# Patient Record
Sex: MALE | Race: WHITE | NOT HISPANIC OR LATINO | Employment: OTHER | URBAN - METROPOLITAN AREA
[De-identification: names, ages, dates, MRNs, and addresses within clinical notes are randomized per-mention and may not be internally consistent; named-entity substitution may affect disease eponyms.]

---

## 2017-01-06 ENCOUNTER — OFFICE VISIT (OUTPATIENT)
Dept: OPHTHALMOLOGY | Facility: CLINIC | Age: 82
End: 2017-01-06
Payer: MEDICARE

## 2017-01-06 VITALS — DIASTOLIC BLOOD PRESSURE: 74 MMHG | SYSTOLIC BLOOD PRESSURE: 145 MMHG | HEART RATE: 63 BPM

## 2017-01-06 DIAGNOSIS — H35.3211 EXUDATIVE AGE-RELATED MACULAR DEGENERATION OF RIGHT EYE WITH ACTIVE CHOROIDAL NEOVASCULARIZATION: Primary | ICD-10-CM

## 2017-01-06 PROCEDURE — 92014 COMPRE OPH EXAM EST PT 1/>: CPT | Mod: 25,S$PBB,, | Performed by: OPHTHALMOLOGY

## 2017-01-06 PROCEDURE — 99999 PR PBB SHADOW E&M-EST. PATIENT-LVL III: CPT | Mod: PBBFAC,,, | Performed by: OPHTHALMOLOGY

## 2017-01-06 PROCEDURE — 99213 OFFICE O/P EST LOW 20 MIN: CPT | Mod: PBBFAC | Performed by: OPHTHALMOLOGY

## 2017-01-06 PROCEDURE — 92134 CPTRZ OPH DX IMG PST SGM RTA: CPT | Mod: PBBFAC | Performed by: OPHTHALMOLOGY

## 2017-01-06 PROCEDURE — 67028 INJECTION EYE DRUG: CPT | Mod: PBBFAC,RT | Performed by: OPHTHALMOLOGY

## 2017-01-06 PROCEDURE — 67028 INJECTION EYE DRUG: CPT | Mod: S$PBB,RT,, | Performed by: OPHTHALMOLOGY

## 2017-01-06 RX ADMIN — AFLIBERCEPT 2 MG: 40 INJECTION, SOLUTION INTRAVITREAL at 12:01

## 2017-01-06 NOTE — PROGRESS NOTES
HPI     DLS 10/20/16        89 Y/O M here today for his 1 mo Eylea OD #22 ck for   the treatment of ARMD. Pt states' my vision is about the same and no   complications after last injection. stj       POHX:   S/P Lucentis OD x 2 (11/02/2015)   S/P Eylea OD X 22 (10/20/16)   1. Retinal Vasculitis   2. Posterior Uveitis   3. H/X Retinal Hole/ Tear OS   S/P Cryo OS X 2 (5/1/86; 4/7/86)                  Last edited by Ngoc Adorno MA on 1/6/2017 10:52 AM.     [place] SDOCT:   OD: good quality, PED with SRF, slight increase over prior scan  OS: good quality, ERM, RPE atrophy, no fluid    Assessment /Plan     For exam results, see Encounter Report.    Exudative age-related macular degeneration of right eye with active choroidal neovascularization  -     Prior Authorization Order  -     aflibercept Soln 2 mg; 0.05 mLs (2 mg total) by Intravitreal route one time.  -     Posterior Segment OCT Retina-Both eyes    Other orders  -     Cancel: OCT- Retina    Recommend continue Eylea OD for active CNVM with SRF    Risks, benefits, and alternatives to treatment were discussed in detail with the patient.  The patient voiced understanding and wished to proceed with the procedure.  See separate consent form.    Eyelea Injection Procedure Note:  Diagnosis: Wet AMD Right Eye    Topical Proparacaine drop placed then topical 10% Betadine swabs to lids, lashes, and conjunctiva.  Viscous 2% lidocaine applied to conjunctiva.  Sterile gloves used, and sterile lid speculum placed.  10% Betadine swabbed at injection site again prior to injection.  Eylea 2 mg in 0.05cc Injected at 8:00 position 3.5-4mm posterior to the limbus.  Complications: None  Va at least CF at 5 feet post injection.  Retina, ONH, IOP normal after injection.    Return to clinic in 1 months for reevaluation, OCT, and possible injection depending upon findings.  Patient should return immediately PRN.  Retinal Detachment and Endophthalmitis precautions given.

## 2017-01-06 NOTE — PATIENT INSTRUCTIONS

## 2017-01-06 NOTE — MR AVS SNAPSHOT
[facility] - Ophthalmology  [address]  [place] [address]  Phone: [phone]  Fax: [phone]                  MECHE Lacey   2017 10:30 AM   Office Visit    Description:  Male : 3/13/1928   Provider:  Nick Munoz MD   Department:  [facility] - Ophthalmology           Reason for Visit     1 mo W/ARMD CK           Diagnoses this Visit        Comments    Exudative age-related macular degeneration of right eye with active choroidal neovascularization    -  Primary            To Do List           Future Appointments        Provider Department Dept Phone    2017 1:00 PM Katia Chauhan MD [facility] [phone]    2017 9:30 AM Nick Munoz MD [facility] [phone]      Goals (5 Years of Data)     None      [facility]     [facility] Nurse Care Line - 24/7 Assistance  Registered nurses in the [facility] provide clinical advisement, health education, appointment booking, and other advisory services.  Call for this free service at [phone].             Medications           Message regarding Medications     Verify the changes and/or additions to your medication regime listed below are the same as discussed with your clinician today.  If any of these changes or additions are incorrect, please notify your healthcare provider.        These medications were administered today        Dose Freq    aflibercept Soln 2 mg 2 mg Clinic/HOD 1 time    Si.05 mLs (2 mg total) by Intravitreal route one time.    Class: Normal    Route: Intravitreal           Verify that the below list of medications is an accurate representation of the medications you are currently taking.  If none reported, the list may be blank. If incorrect, please contact your healthcare provider. Carry this list with you in case of emergency.           Current Medications     aspirin (ECOTRIN) 81 MG EC tablet Take 81 mg by mouth once daily.       atorvastatin (LIPITOR) 40 MG tablet TAKE ONE TABLET BY MOUTH DAILY    b complex vitamins tablet Take 1 tablet by mouth once daily.      calcium carbonate 220 mg capsule Take 250 mg by mouth 2 (two) times daily with meals.      CLOPIDOGREL BISULFATE (PLAVIX ORAL) Take by mouth.      doxycycline (VIBRA-TABS) 100 MG tablet Take 100 mg by mouth as needed.    fish oil-omega-3 fatty acids 300-1,000 mg capsule Take 2 g by mouth once daily.      irbesartan (AVAPRO) 150 MG tablet Take 150 mg by mouth.    IRBESARTAN/HYDROCHLOROTHIAZIDE (AVALIDE ORAL) Take by mouth.      megestrol (MEGACE) 400 mg/10 mL (40 mg/mL) Susp Take 400 mg by mouth.    multivitamin (MULTIVITAMIN) per tablet Take 1 tablet by mouth once daily.      nebivolol (BYSTOLIC) 5 MG Tab Take 5 mg by mouth.    nifedipine (ADALAT CC) 30 MG TbSR Take 30 mg by mouth.    tramadol (ULTRAM) 50 mg tablet Take 50 mg by mouth as needed.    pantoprazole (PROTONIX) 40 MG tablet Take 40 mg by mouth.           Clinical Reference Information           Vital Signs - Last Recorded  Most recent update: 1/6/2017 10:51 AM by Ngoc Adorno MA    BP Pulse                (!) 145/74 (BP Location: Right arm, Patient Position: Sitting, BP Method: Automatic) 63          Blood Pressure          Most Recent Value    BP  (!)  145/74      Allergies as of 1/6/2017     No Known Allergies      Immunizations Administered on Date of Encounter - 1/6/2017     None      Orders Placed During Today's Visit      Normal Orders This Visit    Posterior Segment OCT Retina-Both eyes     Prior Authorization Order

## 2017-01-12 ENCOUNTER — INITIAL CONSULT (OUTPATIENT)
Dept: OPHTHALMOLOGY | Facility: CLINIC | Age: 82
End: 2017-01-12
Payer: MEDICARE

## 2017-01-12 DIAGNOSIS — H02.839 DERMATOCHALASIS OF EYELID: Primary | ICD-10-CM

## 2017-01-12 DIAGNOSIS — H02.59 LAXITY OF EYELID: ICD-10-CM

## 2017-01-12 PROCEDURE — 99213 OFFICE O/P EST LOW 20 MIN: CPT | Mod: PBBFAC | Performed by: OPHTHALMOLOGY

## 2017-01-12 PROCEDURE — 92012 INTRM OPH EXAM EST PATIENT: CPT | Mod: S$PBB,,, | Performed by: OPHTHALMOLOGY

## 2017-01-12 PROCEDURE — 99999 PR PBB SHADOW E&M-EST. PATIENT-LVL III: CPT | Mod: PBBFAC,,, | Performed by: OPHTHALMOLOGY

## 2017-01-12 NOTE — LETTER
[facility] - Ophthalmology  [address]  [facility] [address]  Phone: [phone]  Fax: [phone]   January 16, 2017    Kathryn Sanchez, OD  [address]  [facility] [address]    Patient: MECHE Lacey   MR Number: [id number]   YOB: 1928   Date of Visit: 1/12/2017       Dear Dr. Sanchez :    Thank you for referring MECHE Lacey to me for evaluation. Here is my assessment and plan of care:     /Plan     For exam results, see Encounter Report.    Dermatochalasis of eyelid    Laxity of eyelid    Patient bothered by bilateral lower eyelid herniated orbital fat and dermatochalasis.  Plan for bilateral lower eyelid blepharoplasty with small pinch and canthoplasty.    Hold ASA, NSAIDS, and fish oil 5 to 7 days prior to procedure.    Return for surgery               For exam results, see Encounter Report.    Dermatochalasis of eyelid    Laxity of eyelid    Patient bothered by bilateral lower eyelid herniated orbital fat and dermatochalasis.  Plan for bilateral lower eyelid blepharoplasty with small pinch and canthoplasty.    Hold ASA, NSAIDS, and fish oil 5 to 7 days prior to procedure.    Return for surgery              If you have questions, please do not hesitate to call me. I look forward to following Mr. MECHE Lacey along with you.    Sincerely,        Katia Chauhan MD       CC  No Recipients

## 2017-01-12 NOTE — PROGRESS NOTES
HPI     Pt states that  the swelling under the eyes is still there x 2   month thought it would go away. No pain.  Patient with history of   bilateral lower eyelid blepharoplasty with Dr. Mock approximately 8 years   ago.      Gtts: bang PRN           Last edited by Katia Chauhan MD on 1/12/2017  2:01 PM.         Assessment /Plan     For exam results, see Encounter Report.    Dermatochalasis of eyelid    Laxity of eyelid    Patient bothered by bilateral lower eyelid herniated orbital fat and dermatochalasis.  Plan for bilateral lower eyelid blepharoplasty with small pinch and canthoplasty.    Hold ASA, NSAIDS, and fish oil 5 to 7 days prior to procedure.    Return for surgery

## 2017-01-12 NOTE — LETTER
January 16, 2017      Kathryn Sanchez, OD  [address]  [facility] [address]           [facility] - Ophthalmology  [address]  [facility] [address]  Phone: [phone]  Fax: [phone]          Patient: MECHE Lacey   MR Number: [id number]   YOB: 1928   Date of Visit: 1/12/2017       Dear Dr. Kathryn Sanchez:    Thank you for referring MECHE Lacey to me for evaluation. Attached you will find relevant portions of my assessment and plan of care.    If you have questions, please do not hesitate to call me. I look forward to following MECHE Lacey along with you.    Sincerely,    Katia Chauhan MD    Enclosure  CC:  No Recipients    If you would like to receive this communication electronically, please contact [email or web address] or [phone] to request more information on [facility] Link access.    For providers and/or their staff who would like to refer a patient to [facility], please contact us through our one-stop-shop provider referral line, [facility], [place], at [phone].    If you feel you have received this communication in error or would no longer like to receive these types of communications, please e-mail [email or web address]